# Patient Record
Sex: MALE | Race: WHITE | Employment: UNEMPLOYED | URBAN - METROPOLITAN AREA
[De-identification: names, ages, dates, MRNs, and addresses within clinical notes are randomized per-mention and may not be internally consistent; named-entity substitution may affect disease eponyms.]

---

## 2017-02-16 ENCOUNTER — APPOINTMENT (OUTPATIENT)
Dept: CT IMAGING | Age: 57
End: 2017-02-16
Attending: EMERGENCY MEDICINE
Payer: OTHER GOVERNMENT

## 2017-02-16 ENCOUNTER — HOSPITAL ENCOUNTER (EMERGENCY)
Age: 57
Discharge: HOME | End: 2017-02-16
Attending: EMERGENCY MEDICINE
Payer: OTHER GOVERNMENT

## 2017-02-16 VITALS
TEMPERATURE: 98.6 F | HEART RATE: 63 BPM | DIASTOLIC BLOOD PRESSURE: 68 MMHG | SYSTOLIC BLOOD PRESSURE: 128 MMHG | WEIGHT: 201 LBS | OXYGEN SATURATION: 98 % | HEIGHT: 67 IN | RESPIRATION RATE: 20 BRPM | BODY MASS INDEX: 31.55 KG/M2

## 2017-02-16 DIAGNOSIS — R53.83 FATIGUE, UNSPECIFIED TYPE: Primary | ICD-10-CM

## 2017-02-16 LAB
ALBUMIN SERPL BCP-MCNC: 3.5 G/DL (ref 3.5–5)
ALBUMIN/GLOB SERPL: 0.9 {RATIO} (ref 1.2–3.5)
ALP SERPL-CCNC: 129 U/L (ref 50–136)
ALT SERPL-CCNC: 27 U/L (ref 12–65)
ANION GAP BLD CALC-SCNC: 9 MMOL/L (ref 7–16)
AST SERPL W P-5'-P-CCNC: 16 U/L (ref 15–37)
ATRIAL RATE: 66 BPM
BASOPHILS # BLD AUTO: 0.1 K/UL (ref 0–0.2)
BASOPHILS # BLD: 1 % (ref 0–2)
BILIRUB SERPL-MCNC: 0.6 MG/DL (ref 0.2–1.1)
BUN SERPL-MCNC: 14 MG/DL (ref 6–23)
CALCIUM SERPL-MCNC: 9.1 MG/DL (ref 8.3–10.4)
CALCULATED P AXIS, ECG09: 42 DEGREES
CALCULATED R AXIS, ECG10: 58 DEGREES
CALCULATED T AXIS, ECG11: 5 DEGREES
CHLORIDE SERPL-SCNC: 104 MMOL/L (ref 98–107)
CO2 SERPL-SCNC: 26 MMOL/L (ref 21–32)
CREAT SERPL-MCNC: 1.3 MG/DL (ref 0.8–1.5)
DIAGNOSIS, 93000: NORMAL
DIASTOLIC BP, ECG02: NORMAL MMHG
DIFFERENTIAL METHOD BLD: ABNORMAL
EOSINOPHIL # BLD: 1.3 K/UL (ref 0–0.8)
EOSINOPHIL NFR BLD: 12 % (ref 0.5–7.8)
ERYTHROCYTE [DISTWIDTH] IN BLOOD BY AUTOMATED COUNT: 13.1 % (ref 11.9–14.6)
FLUAV AG NPH QL IA: NEGATIVE
FLUBV AG NPH QL IA: NEGATIVE
GLOBULIN SER CALC-MCNC: 3.9 G/DL (ref 2.3–3.5)
GLUCOSE BLD STRIP.AUTO-MCNC: 251 MG/DL (ref 65–100)
GLUCOSE SERPL-MCNC: 254 MG/DL (ref 65–100)
HCT VFR BLD AUTO: 41.8 % (ref 41.1–50.3)
HGB BLD-MCNC: 14.7 G/DL (ref 13.6–17.2)
IMM GRANULOCYTES # BLD: 0 K/UL (ref 0–0.5)
IMM GRANULOCYTES NFR BLD AUTO: 0.2 % (ref 0–5)
LYMPHOCYTES # BLD AUTO: 12 % (ref 13–44)
LYMPHOCYTES # BLD: 1.2 K/UL (ref 0.5–4.6)
MCH RBC QN AUTO: 31.9 PG (ref 26.1–32.9)
MCHC RBC AUTO-ENTMCNC: 35.2 G/DL (ref 31.4–35)
MCV RBC AUTO: 90.7 FL (ref 79.6–97.8)
MONOCYTES # BLD: 0.7 K/UL (ref 0.1–1.3)
MONOCYTES NFR BLD AUTO: 6 % (ref 4–12)
NEUTS SEG # BLD: 7.1 K/UL (ref 1.7–8.2)
NEUTS SEG NFR BLD AUTO: 69 % (ref 43–78)
P-R INTERVAL, ECG05: 152 MS
PLATELET # BLD AUTO: 234 K/UL (ref 150–450)
PMV BLD AUTO: 9.8 FL (ref 10.8–14.1)
POTASSIUM SERPL-SCNC: 3.7 MMOL/L (ref 3.5–5.1)
PROT SERPL-MCNC: 7.4 G/DL (ref 6.3–8.2)
Q-T INTERVAL, ECG07: 382 MS
QRS DURATION, ECG06: 88 MS
QTC CALCULATION (BEZET), ECG08: 400 MS
RBC # BLD AUTO: 4.61 M/UL (ref 4.23–5.67)
SODIUM SERPL-SCNC: 139 MMOL/L (ref 136–145)
SYSTOLIC BP, ECG01: NORMAL MMHG
VENTRICULAR RATE, ECG03: 66 BPM
WBC # BLD AUTO: 10.4 K/UL (ref 4.3–11.1)

## 2017-02-16 PROCEDURE — 81003 URINALYSIS AUTO W/O SCOPE: CPT | Performed by: EMERGENCY MEDICINE

## 2017-02-16 PROCEDURE — 85025 COMPLETE CBC W/AUTO DIFF WBC: CPT | Performed by: EMERGENCY MEDICINE

## 2017-02-16 PROCEDURE — 96361 HYDRATE IV INFUSION ADD-ON: CPT | Performed by: EMERGENCY MEDICINE

## 2017-02-16 PROCEDURE — 70450 CT HEAD/BRAIN W/O DYE: CPT

## 2017-02-16 PROCEDURE — 82962 GLUCOSE BLOOD TEST: CPT

## 2017-02-16 PROCEDURE — 99285 EMERGENCY DEPT VISIT HI MDM: CPT | Performed by: EMERGENCY MEDICINE

## 2017-02-16 PROCEDURE — 74011250636 HC RX REV CODE- 250/636: Performed by: EMERGENCY MEDICINE

## 2017-02-16 PROCEDURE — 87804 INFLUENZA ASSAY W/OPTIC: CPT | Performed by: EMERGENCY MEDICINE

## 2017-02-16 PROCEDURE — 96360 HYDRATION IV INFUSION INIT: CPT | Performed by: EMERGENCY MEDICINE

## 2017-02-16 PROCEDURE — 80053 COMPREHEN METABOLIC PANEL: CPT | Performed by: EMERGENCY MEDICINE

## 2017-02-16 PROCEDURE — 93005 ELECTROCARDIOGRAM TRACING: CPT | Performed by: EMERGENCY MEDICINE

## 2017-02-16 RX ADMIN — SODIUM CHLORIDE 1000 ML: 900 INJECTION, SOLUTION INTRAVENOUS at 11:31

## 2017-02-16 NOTE — DISCHARGE INSTRUCTIONS
Fatigue: Care Instructions  Your Care Instructions  Fatigue is a feeling of tiredness, exhaustion, or lack of energy. You may feel fatigue because of too much or not enough activity. It can also come from stress, lack of sleep, boredom, and poor diet. Many medical problems, such as viral infections, can cause fatigue. Emotional problems, especially depression, are often the cause of fatigue. Fatigue is most often a symptom of another problem. Treatment for fatigue depends on the cause. For example, if you have fatigue because you have a certain health problem, treating this problem also treats your fatigue. If depression or anxiety is the cause, treatment may help. Follow-up care is a key part of your treatment and safety. Be sure to make and go to all appointments, and call your doctor if you are having problems. It's also a good idea to know your test results and keep a list of the medicines you take. How can you care for yourself at home? · Get regular exercise. But don't overdo it. Go back and forth between rest and exercise. · Get plenty of rest.  · Eat a healthy diet. Do not skip meals, especially breakfast.  · Reduce your use of caffeine, tobacco, and alcohol. Caffeine is most often found in coffee, tea, cola drinks, and chocolate. · Limit medicines that can cause fatigue. This includes tranquilizers and cold and allergy medicines. When should you call for help? Watch closely for changes in your health, and be sure to contact your doctor if:  · You have new symptoms such as fever or a rash. · Your fatigue gets worse. · You have been feeling down, depressed, or hopeless. Or you may have lost interest in things that you usually enjoy. · You are not getting better as expected. Where can you learn more? Go to http://cathy-mohini.info/. Enter P263 in the search box to learn more about \"Fatigue: Care Instructions. \"  Current as of: May 27, 2016  Content Version: 11.1  © 4621-1036 [employer]. Care instructions adapted under license by [employer] (which disclaims liability or warranty for this information). If you have questions about a medical condition or this instruction, always ask your healthcare professional. [place] [address] any warranty or liability for your use of this information.

## 2017-02-16 NOTE — ED NOTES
Patient was able to ambulate 20ft with minimal assistance. Patient is awaiting his ride before discharged.

## 2017-02-16 NOTE — ED TRIAGE NOTES
Reports may have been given too much BP meds around 7 this AM.  States felt weak all over and had difficulty with speech but also states a recent CVA with speech deficits.

## 2017-02-16 NOTE — ED NOTES
Discharge instructions provided and explained to the pt. Opportunity for questions provided. Jo Quiroga is transporting the patient home.

## 2017-02-16 NOTE — ED NOTES
Patient is resting on the stretcher. Patient is on cardiac monitor, cycling vitals, and continuous pulse ox. Patient denies needs at this time. Side rails x 2 for safety. Will continue to monitor.

## 2017-02-16 NOTE — ED PROVIDER NOTES
HPI Comments: Patient is a 47-year-old male history of hypertension and diabetes who reports he had a stroke 8 days ago and was treated at [facility]. States he is not taking aspirin. He comes in today because he thought he took one too many of his blood pressure medications and felt somewhat weak. No focal weakness just general feeling of low energy. Patient denies any fevers, cough, shortness of breath. Patient also denies any headache. He was ambulatory here in the emergency department. Care everywhere demonstrates that he went to [address] [place] [address] emergency department on 2/2/17 with headache and similar complaints. CT scan at that time did not demonstrate any significant changes. Patient is a 60 y.o. male presenting with fatigue. The history is provided by the patient. No  was used. Fatigue   Pertinent negatives include no shortness of breath and no headaches. Past Medical History:   Diagnosis Date    Diabetes ([facility] [address]     Hypertension     Stroke [facility])        Past Surgical History:   Procedure Laterality Date    Hx orthopaedic       back surgery with pin placement. History reviewed. No pertinent family history. Social History     Social History    Marital status:      Spouse name: N/A    Number of children: N/A    Years of education: N/A     Occupational History    Not on file. Social History Main Topics    Smoking status: Never Smoker    Smokeless tobacco: Not on file    Alcohol use No    Drug use: Not on file    Sexual activity: Not on file     Other Topics Concern    Not on file     Social History Narrative         ALLERGIES: Contrast agent [iodine]    Review of Systems   Constitutional: Positive for fatigue. Negative for fever. HENT: Negative for sore throat. Eyes: Negative for pain. Respiratory: Negative for cough, chest tightness and shortness of breath. Cardiovascular: Negative for leg swelling. Gastrointestinal: Negative for abdominal pain. Genitourinary: Negative for dysuria. Musculoskeletal: Negative for back pain. Neurological: Negative for syncope and headaches. Vitals:    02/16/17 0945   BP: 131/75   Pulse: 80   Resp: 18   Temp: 98 °F (36.7 °C)   SpO2: 97%   Weight: 91.2 kg (201 lb)   Height: 5' 7\" (1.702 m)            Physical Exam   Constitutional: He is oriented to person, place, and time. He appears well-developed and well-nourished. No distress. HENT:   Head: Normocephalic and atraumatic. Eyes: Conjunctivae and EOM are normal. Pupils are equal, round, and reactive to light. Neck: Normal range of motion. Neck supple. Cardiovascular: Normal rate, regular rhythm and normal heart sounds. Pulmonary/Chest: Effort normal and breath sounds normal. No respiratory distress. He has no wheezes. He has no rales. Abdominal: Soft. He exhibits no distension. There is no tenderness. There is no rebound. Musculoskeletal: Normal range of motion. He exhibits no edema or tenderness. Neurological: He is alert and oriented to person, place, and time. No cranial nerve deficit. Coordination normal.   She is at his neurologic baseline with no acute deficits. Skin: Skin is warm and dry. No rash noted. He is not diaphoretic. Psychiatric: He has a normal mood and affect. His behavior is normal.   Vitals reviewed. MDM  Number of Diagnoses or Management Options  Fatigue, unspecified type: new and does not require workup  Diagnosis management comments: Patient reports he feels significantly improved. His labs and CT scan appeared quite normal.he has no focal deficits. He is requesting discharge at this time. Will discharge to home. I advised follow-up with his neurologist near future. Continue taking aspirin.        Amount and/or Complexity of Data Reviewed  Clinical lab tests: ordered and reviewed (Results for orders placed or performed during the hospital encounter of 02/16/17  -INFLUENZA A & B AG (RAPID TEST)       Result Value                         Ref Range                       Influenza A Ag                                    NEGATIVE                      NEG                             Influenza B Ag                                    NEGATIVE                      NEG                        -CBC WITH AUTOMATED DIFF       Result                                            Value                         Ref Range                       WBC                                               10.4                          4.3 - 11.1 K/uL                 RBC                                               4.61                          4.23 - 5.67 M/uL                HGB                                               14.7                          13.6 - 17.2 g/dL                HCT                                               41.8                          41.1 - 50.3 %                   MCV                                               90.7                          79.6 - 97.8 FL                  MCH                                               31.9                          26.1 - 32.9 PG                  MCHC                                              35.2 (H)                      31.4 - 35.0 g/dL                RDW                                               13.1                          11.9 - 14.6 %                   PLATELET                                          234                           150 - 450 K/uL                  MPV                                               9.8 (L)                       10.8 - 14.1 FL                  DF                                                AUTOMATED                                                     NEUTROPHILS                                       69                            43 - 78 %                       LYMPHOCYTES                                       12 (L)                        13 - 44 %                       MONOCYTES 6                             4.0 - 12.0 %                    EOSINOPHILS                                       12 (H)                        0.5 - 7.8 %                     BASOPHILS                                         1                             0.0 - 2.0 %                     IMMATURE GRANULOCYTES                             0.2                           0.0 - 5.0 %                     ABS. NEUTROPHILS                                  7.1                           1.7 - 8.2 K/UL                  ABS. LYMPHOCYTES                                  1.2                           0.5 - 4.6 K/UL                  ABS. MONOCYTES                                    0.7                           0.1 - 1.3 K/UL                  ABS. EOSINOPHILS                                  1.3 (H)                       0.0 - 0.8 K/UL                  ABS. BASOPHILS                                    0.1                           0.0 - 0.2 K/UL                  ABS. IMM.  GRANS.                                  0.0                           0.0 - 0.5 K/UL             -METABOLIC PANEL, COMPREHENSIVE       Result                                            Value                         Ref Range                       Sodium                                            139                           136 - 145 mmol/L                Potassium                                         3.7                           3.5 - 5.1 mmol/L                Chloride                                          104                           98 - 107 mmol/L                 CO2                                               26                            21 - 32 mmol/L                  Anion gap                                         9                             7 - 16 mmol/L                   Glucose                                           254 (H)                       65 - 100 mg/dL                  BUN 14                            6 - 23 MG/DL                    Creatinine                                        1.30                          0.8 - 1.5 MG/DL                 GFR est AA                                        >60                           >60 ml/min/1.73m2               GFR est non-AA                                    >60                           >60 ml/min/1.73m2               Calcium                                           9.1                           8.3 - 10.4 MG/DL                Bilirubin, total                                  0.6                           0.2 - 1.1 MG/DL                 ALT (SGPT)                                        27                            12 - 65 U/L                     AST (SGOT)                                        16                            15 - 37 U/L                     Alk. phosphatase                                  129                           50 - 136 U/L                    Protein, total                                    7.4                           6.3 - 8.2 g/dL                  Albumin                                           3.5                           3.5 - 5.0 g/dL                  Globulin                                          3.9 (H)                       2.3 - 3.5 g/dL                  A-G Ratio                                         0.9 (L)                       1.2 - 3.5                  -GLUCOSE, POC       Result                                            Value                         Ref Range                       Glucose (POC)                                     251 (H)                       65 - 100 mg/dL             -EKG, 12 LEAD, INITIAL       Result                                            Value                         Ref Range                       Systolic BP                                                                     mmHg                            Diastolic BP mmHg                            Ventricular Rate                                  66                            BPM                             Atrial Rate                                       66                            BPM                             P-R Interval                                      152                           ms                              QRS Duration                                      88                            ms                              Q-T Interval                                      382                           ms                              QTC Calculation (Bezet)                           400                           ms                              Calculated P Axis                                 42                            degrees                         Calculated R Axis                                 58                            degrees                         Calculated T Axis                                 5                             degrees                         Diagnosis                                                                                                   !! AGE AND GENDER SPECIFIC ECG ANALYSIS !! Normal sinus rhythm   Normal ECG   No previous ECGs available   Confirmed by Licha Hernandez MD (), BEN VANN ([id number]) on 2/16/2017 12:08:38 PM     )  Tests in the radiology section of CPT®: ordered and reviewed (Ct Head Wo Cont    Result Date: 2/16/2017  Examination: CT scan of the brain without contrast. History: 23-year-old male with generalized weakness and difficulty speaking. Patient reports recent CVA with speech deficits. Technique: 5 mm axial imaging of the brain from the posterior fossa to the vertex. Radiation dose reduction techniques were used for this study.   All CT scans performed at this facility use one or all of the following: Automated exposure control, adjustment of the mA and/or kVp according to patient's size, and iterative reconstruction. Comparison:  None. Findings: There is no evidence of acute intracranial edema, hemorrhage, or hydrocephalus. There is no extra-axial fluid collection or midline shift. There are a few scattered foci of decreased attenuation in the right basal ganglia which may represent small lacunar infarcts. There is no evidence of acute vascular territory infarct. The included paranasal sinuses and mastoid air cells are clear.      IMPRESSION: No acute intracranial abnormality.    )  Review and summarize past medical records: yes  Independent visualization of images, tracings, or specimens: yes    Risk of Complications, Morbidity, and/or Mortality  Presenting problems: moderate  Diagnostic procedures: moderate  Management options: moderate    Patient Progress  Patient progress: stable    ED Course       Procedures